# Patient Record
Sex: FEMALE | Race: WHITE | NOT HISPANIC OR LATINO | Employment: PART TIME | URBAN - NONMETROPOLITAN AREA
[De-identification: names, ages, dates, MRNs, and addresses within clinical notes are randomized per-mention and may not be internally consistent; named-entity substitution may affect disease eponyms.]

---
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
## 2023-07-25 ENCOUNTER — APPOINTMENT (OUTPATIENT)
Dept: GENERAL RADIOLOGY | Facility: HOSPITAL | Age: 18
End: 2023-07-25
Payer: MEDICAID

## 2023-07-25 ENCOUNTER — HOSPITAL ENCOUNTER (EMERGENCY)
Facility: HOSPITAL | Age: 18
Discharge: HOME | End: 2023-07-25
Attending: STUDENT IN AN ORGANIZED HEALTH CARE EDUCATION/TRAINING PROGRAM | Admitting: STUDENT IN AN ORGANIZED HEALTH CARE EDUCATION/TRAINING PROGRAM
Payer: MEDICAID

## 2023-07-25 VITALS
RESPIRATION RATE: 26 BRPM | BODY MASS INDEX: 19.51 KG/M2 | SYSTOLIC BLOOD PRESSURE: 109 MMHG | DIASTOLIC BLOOD PRESSURE: 63 MMHG | TEMPERATURE: 98.2 F | HEART RATE: 115 BPM | WEIGHT: 106 LBS | HEIGHT: 62 IN | OXYGEN SATURATION: 97 %

## 2023-07-25 DIAGNOSIS — S67.21XA CRUSHING INJURY OF RIGHT HAND, INITIAL ENCOUNTER: Primary | ICD-10-CM

## 2023-07-25 LAB
QT INTERVAL: 308 MS
QTC INTERVAL: 438 MS

## 2023-07-25 PROCEDURE — 99283 EMERGENCY DEPT VISIT LOW MDM: CPT

## 2023-07-25 PROCEDURE — 93005 ELECTROCARDIOGRAM TRACING: CPT

## 2023-07-25 PROCEDURE — 93005 ELECTROCARDIOGRAM TRACING: CPT | Performed by: STUDENT IN AN ORGANIZED HEALTH CARE EDUCATION/TRAINING PROGRAM

## 2023-07-25 PROCEDURE — 73130 X-RAY EXAM OF HAND: CPT

## 2023-07-25 RX ORDER — IBUPROFEN 400 MG/1
400 TABLET ORAL ONCE
Status: COMPLETED | OUTPATIENT
Start: 2023-07-25 | End: 2023-07-25

## 2023-07-25 RX ADMIN — IBUPROFEN 400 MG: 400 TABLET ORAL at 22:31

## 2023-07-25 NOTE — Clinical Note
[facility] EMERGENCY DEPARTMENT  [address] [place] [address]  Phone: [phone]    Yu Oakley was seen and treated in our emergency department on 7/25/2023.  She may return to work on 07/28/2023.         Thank you for choosing [facility].    Alis Mccloud MD

## 2023-07-26 NOTE — DISCHARGE INSTRUCTIONS
Take ibuprofen 400 mg every 6 hours as needed with food, keep your hand elevated, apply ice for 20 minutes each time 4-5 times a day and move your fingers frequently.  Wash your finger with soap and water, pat dry and apply bacitracin and put a Band-Aid over your injury until it heals.  Follow-up with your primary care or call the residents if you do not have 1 to establish care.  Return as needed.

## 2023-07-26 NOTE — ED PROVIDER NOTES
Subjective   History of Present Illness  Patient presents with right hand pain after smashing her hand in a car door right before arrival.  She endorses pain is mostly in her third and fourth fingers.    Review of Systems   Constitutional:  Negative for activity change and appetite change.   HENT:  Negative for congestion and ear pain.    Eyes:  Negative for pain and discharge.   Respiratory:  Negative for chest tightness and shortness of breath.    Cardiovascular:  Negative for chest pain and palpitations.   Gastrointestinal:  Negative for abdominal distention and abdominal pain.   Endocrine: Negative for cold intolerance and heat intolerance.   Genitourinary:  Negative for difficulty urinating and dysuria.   Musculoskeletal:  Positive for arthralgias. Negative for back pain.   Skin:  Positive for wound (right 4th finger). Negative for color change and rash.   Allergic/Immunologic: Negative for environmental allergies and food allergies.   Neurological:  Negative for dizziness and headaches.   Hematological:  Negative for adenopathy. Does not bruise/bleed easily.   Psychiatric/Behavioral:  Negative for agitation and confusion.      Past Medical History:   Diagnosis Date    Anxiety     Eczema        No Known Allergies    History reviewed. No pertinent surgical history.    Family History   Problem Relation Age of Onset    No Known Problems Mother     No Known Problems Father        Social History     Socioeconomic History    Marital status: Single   Tobacco Use    Smoking status: Never    Smokeless tobacco: Never   Substance and Sexual Activity    Alcohol use: No    Drug use: No    Sexual activity: Never           Objective   Physical Exam  Vitals and nursing note reviewed.   Constitutional:       Appearance: She is well-developed.   HENT:      Head: Normocephalic and atraumatic.   Eyes:      Pupils: Pupils are equal, round, and reactive to light.   Neck:      Thyroid: No thyromegaly.      Vascular: No JVD.       "Trachea: No tracheal deviation.   Cardiovascular:      Rate and Rhythm: Normal rate.      Pulses:           Radial pulses are 2+ on the right side and 2+ on the left side.        Dorsalis pedis pulses are 2+ on the right side and 2+ on the left side.      Heart sounds: Normal heart sounds, S1 normal and S2 normal.   Pulmonary:      Effort: Pulmonary effort is normal.      Breath sounds: Normal breath sounds.   Abdominal:      General: Bowel sounds are normal.   Musculoskeletal:         General: Tenderness (right hand, 3rd and 4th fingers more tender) present. Normal range of motion.   Skin:     General: Skin is warm and dry.      Capillary Refill: Capillary refill takes 2 to 3 seconds.      Findings: Lesion (aprox 3 mm lac to right 4th finger) present.   Neurological:      Mental Status: She is alert and oriented to person, place, and time.      GCS: GCS eye subscore is 4. GCS verbal subscore is 5. GCS motor subscore is 6.   Psychiatric:         Speech: Speech normal.         Behavior: Behavior normal.         Thought Content: Thought content normal.       Procedures           ED Course           Vitals:    07/25/23 2133 07/25/23 2257   BP: 109/63    BP Location: Left arm    Patient Position: Sitting    Pulse: (!) 133 115   Resp: 26    Temp: 98.2 °F (36.8 °C)    TempSrc: Oral    SpO2: 97%    Weight: 48.1 kg (106 lb)    Height: 157.5 cm (62\")       Lab Results (last 24 hours)       ** No results found for the last 24 hours. **           No radiology results for the last day                                   Medical Decision Making  Patient with right hand crush injury without radiographical signs of fracture or dislocation.  Small laceration on finger without requiring suturing.  Ice applied with ibuprofen.  Prescription given for ibuprofen with instructions on rest and elevation.  Work note given for the next 2 days.  No identifiable cause for hospitalization or transfer noted on today's exam.  Encourage close " follow-up with primary care.    Problems Addressed:  Crushing injury of right hand, initial encounter: complicated acute illness or injury    Amount and/or Complexity of Data Reviewed  Radiology: ordered.  ECG/medicine tests: ordered.    Risk  Prescription drug management.        Final diagnoses:   Crushing injury of right hand, initial encounter       ED Disposition  ED Disposition       ED Disposition   Discharge    Condition   Stable    Comment   --               [facility]  [address] [place] [address] [phone]  Call in 1 day  to establish care and for follow up         Medication List      No changes were made to your prescriptions during this visit.         This document has been electronically signed by Alis Mccloud MD on July 26, 2023 04:22 CDT  .  Signatureline  Part of this note may be an electronic transcription/translation of spoken language to printed text using the Dragon Dictation System.        Alis Mccloud MD  07/26/23 0422

## 2023-08-27 LAB
QT INTERVAL: 308 MS
QTC INTERVAL: 438 MS